# Patient Record
Sex: MALE | Race: WHITE | URBAN - METROPOLITAN AREA
[De-identification: names, ages, dates, MRNs, and addresses within clinical notes are randomized per-mention and may not be internally consistent; named-entity substitution may affect disease eponyms.]

---

## 2019-12-17 ENCOUNTER — NEW PATIENT (OUTPATIENT)
Dept: URBAN - METROPOLITAN AREA CLINIC 44 | Facility: CLINIC | Age: 64
End: 2019-12-17
Payer: COMMERCIAL

## 2019-12-17 DIAGNOSIS — E11.9 TYPE 2 DIABETES MELLITUS WITHOUT COMPLICATIONS: Primary | ICD-10-CM

## 2019-12-17 DIAGNOSIS — Z79.84 LONG TERM (CURRENT) USE OF ORAL ANTIDIABETIC DRUGS: ICD-10-CM

## 2019-12-17 DIAGNOSIS — H33.052 TOTAL RETINAL DETACHMENT, LEFT EYE: ICD-10-CM

## 2019-12-17 PROCEDURE — 92004 COMPRE OPH EXAM NEW PT 1/>: CPT | Performed by: OPTOMETRIST

## 2019-12-17 ASSESSMENT — INTRAOCULAR PRESSURE
OS: 15
OD: 15

## 2019-12-17 ASSESSMENT — VISUAL ACUITY
OS: 20/20
OD: 20/20

## 2019-12-17 ASSESSMENT — KERATOMETRY: OS: 39.75

## 2022-04-13 ENCOUNTER — OFFICE VISIT (OUTPATIENT)
Dept: URBAN - METROPOLITAN AREA CLINIC 43 | Facility: CLINIC | Age: 67
End: 2022-04-13
Payer: MEDICARE

## 2022-04-13 DIAGNOSIS — H33.8 OTHER RETINAL DETACHMENTS: ICD-10-CM

## 2022-04-13 DIAGNOSIS — H26.492 OTHER SECONDARY CATARACT, LEFT EYE: ICD-10-CM

## 2022-04-13 DIAGNOSIS — Z96.1 PRESENCE OF INTRAOCULAR LENS: ICD-10-CM

## 2022-04-13 DIAGNOSIS — E11.9 TYPE 2 DIABETES MELLITUS W/O COMPLICATION: Primary | ICD-10-CM

## 2022-04-13 DIAGNOSIS — Z79.84 LONG TERM (CURRENT) USE OF ORAL HYPOGLYCEMIC DRUGS: ICD-10-CM

## 2022-04-13 DIAGNOSIS — H40.013 OPEN ANGLE WITH BORDERLINE FINDINGS, LOW RISK, BILATERAL: ICD-10-CM

## 2022-04-13 PROCEDURE — 99214 OFFICE O/P EST MOD 30 MIN: CPT | Performed by: OPTOMETRIST

## 2022-04-13 NOTE — IMPRESSION/PLAN
Impression: Type 2 diabetes mellitus w/o complication: M27.8. Plan: Diabetes type II: no background retinopathy, no signs of neovascularization noted. Discussed ocular and systemic benefits of blood sugar control.

## 2022-04-13 NOTE — IMPRESSION/PLAN
Impression: Open angle with borderline findings, low risk, bilateral: H40.013.  Plan: IOP: 11/12
cupping OD>OS
RTC 3-4 weeks for IOP check with VF/RNFL/PACHS

## 2022-04-13 NOTE — IMPRESSION/PLAN
Impression: Presence of intraocular lens: Z96.1.  Plan: S/P Cataract OU, with multifocal IOLs, OD left farsighted, pt will contact previous surgeon to see if lasik touch up possible

## 2024-01-29 ENCOUNTER — OFFICE VISIT (OUTPATIENT)
Dept: URBAN - METROPOLITAN AREA CLINIC 43 | Facility: CLINIC | Age: 69
End: 2024-01-29
Payer: MEDICARE

## 2024-01-29 DIAGNOSIS — H40.013 OPEN ANGLE WITH BORDERLINE FINDINGS, LOW RISK, BILATERAL: ICD-10-CM

## 2024-01-29 DIAGNOSIS — Z96.1 PRESENCE OF INTRAOCULAR LENS: ICD-10-CM

## 2024-01-29 DIAGNOSIS — Z79.84 LONG TERM (CURRENT) USE OF ORAL HYPOGLYCEMIC DRUGS: ICD-10-CM

## 2024-01-29 DIAGNOSIS — H35.373 PUCKERING OF MACULA, BILATERAL: ICD-10-CM

## 2024-01-29 DIAGNOSIS — H04.123 DRY EYE SYNDROME OF BILATERAL LACRIMAL GLANDS: ICD-10-CM

## 2024-01-29 DIAGNOSIS — E11.9 DIABETES MELLITUS TYPE 2 WITHOUT MENTION OF COMPLICATION: Primary | ICD-10-CM

## 2024-01-29 PROCEDURE — 92014 COMPRE OPH EXAM EST PT 1/>: CPT

## 2024-01-29 PROCEDURE — 76514 ECHO EXAM OF EYE THICKNESS: CPT

## 2024-01-29 PROCEDURE — 92133 CPTRZD OPH DX IMG PST SGM ON: CPT

## 2024-01-29 PROCEDURE — 92134 CPTRZ OPH DX IMG PST SGM RTA: CPT

## 2024-01-29 ASSESSMENT — KERATOMETRY
OD: 40.00
OS: 40.25

## 2024-01-29 ASSESSMENT — VISUAL ACUITY
OS: 20/20
OD: 20/20

## 2024-01-29 ASSESSMENT — INTRAOCULAR PRESSURE
OS: 13
OD: 14

## 2024-10-10 ENCOUNTER — OFFICE VISIT (OUTPATIENT)
Dept: URBAN - METROPOLITAN AREA CLINIC 43 | Facility: CLINIC | Age: 69
End: 2024-10-10
Payer: MEDICARE

## 2024-10-10 DIAGNOSIS — H40.1121 PRIMARY OPEN-ANGLE GLAUCOMA, MILD STAGE, LEFT EYE: Primary | ICD-10-CM

## 2024-10-10 PROCEDURE — 92083 EXTENDED VISUAL FIELD XM: CPT

## 2024-10-10 PROCEDURE — 92133 CPTRZD OPH DX IMG PST SGM ON: CPT

## 2024-10-10 PROCEDURE — 99214 OFFICE O/P EST MOD 30 MIN: CPT

## 2024-10-10 PROCEDURE — 92134 CPTRZ OPH DX IMG PST SGM RTA: CPT

## 2024-10-10 ASSESSMENT — INTRAOCULAR PRESSURE
OD: 13
OD: 15
OS: 12
OS: 14

## 2024-11-18 ENCOUNTER — SURGERY (OUTPATIENT)
Dept: URBAN - METROPOLITAN AREA SURGERY 19 | Facility: SURGERY | Age: 69
End: 2024-11-18
Payer: MEDICARE

## 2024-11-18 PROCEDURE — 65855 TRABECULOPLASTY LASER SURG: CPT | Performed by: OPHTHALMOLOGY

## 2024-11-18 RX ORDER — PREDNISOLONE ACETATE 10 MG/ML
1 % SUSPENSION/ DROPS OPHTHALMIC
Qty: 5 | Refills: 1 | Status: ACTIVE
Start: 2024-11-18

## 2025-01-07 ENCOUNTER — OFFICE VISIT (OUTPATIENT)
Dept: URBAN - METROPOLITAN AREA CLINIC 44 | Facility: CLINIC | Age: 70
End: 2025-01-07
Payer: MEDICARE

## 2025-01-07 DIAGNOSIS — H40.1121 PRIMARY OPEN-ANGLE GLAUCOMA, MILD STAGE, LEFT EYE: Primary | ICD-10-CM

## 2025-01-07 PROCEDURE — 99213 OFFICE O/P EST LOW 20 MIN: CPT

## 2025-01-07 ASSESSMENT — INTRAOCULAR PRESSURE
OS: 15
OD: 14
OS: 14

## 2025-02-04 ENCOUNTER — OFFICE VISIT (OUTPATIENT)
Dept: URBAN - METROPOLITAN AREA CLINIC 44 | Facility: CLINIC | Age: 70
End: 2025-02-04
Payer: MEDICARE

## 2025-02-04 DIAGNOSIS — H40.1121 PRIMARY OPEN-ANGLE GLAUCOMA, MILD STAGE, LEFT EYE: Primary | ICD-10-CM

## 2025-02-04 DIAGNOSIS — Z79.84 LONG TERM (CURRENT) USE OF ORAL HYPOGLYCEMIC DRUGS: ICD-10-CM

## 2025-02-04 DIAGNOSIS — E11.9 DIABETES MELLITUS TYPE 2 WITHOUT MENTION OF COMPLICATION: ICD-10-CM

## 2025-02-04 DIAGNOSIS — H35.373 PUCKERING OF MACULA, BILATERAL: ICD-10-CM

## 2025-02-04 DIAGNOSIS — H04.123 DRY EYE SYNDROME OF BILATERAL LACRIMAL GLANDS: ICD-10-CM

## 2025-02-04 PROCEDURE — 92133 CPTRZD OPH DX IMG PST SGM ON: CPT

## 2025-02-04 PROCEDURE — 92014 COMPRE OPH EXAM EST PT 1/>: CPT

## 2025-02-04 ASSESSMENT — KERATOMETRY
OD: 40.00
OS: 40.38

## 2025-02-04 ASSESSMENT — VISUAL ACUITY
OS: 20/20
OD: 20/20

## 2025-02-04 ASSESSMENT — INTRAOCULAR PRESSURE
OS: 12
OD: 12

## 2025-08-26 ENCOUNTER — OFFICE VISIT (OUTPATIENT)
Dept: URBAN - METROPOLITAN AREA CLINIC 44 | Facility: CLINIC | Age: 70
End: 2025-08-26
Payer: MEDICARE

## 2025-08-26 DIAGNOSIS — H40.1121 PRIMARY OPEN-ANGLE GLAUCOMA, MILD STAGE, LEFT EYE: Primary | ICD-10-CM

## 2025-08-26 PROCEDURE — 99213 OFFICE O/P EST LOW 20 MIN: CPT

## 2025-08-26 PROCEDURE — 92083 EXTENDED VISUAL FIELD XM: CPT

## 2025-08-26 ASSESSMENT — INTRAOCULAR PRESSURE
OD: 12
OS: 12
OD: 13